# Patient Record
Sex: MALE | Race: WHITE | Employment: UNEMPLOYED | URBAN - METROPOLITAN AREA
[De-identification: names, ages, dates, MRNs, and addresses within clinical notes are randomized per-mention and may not be internally consistent; named-entity substitution may affect disease eponyms.]

---

## 2018-05-23 ENCOUNTER — OFFICE VISIT (OUTPATIENT)
Dept: CARDIOLOGY CLINIC | Age: 53
End: 2018-05-23
Payer: COMMERCIAL

## 2018-05-23 VITALS
HEIGHT: 64 IN | DIASTOLIC BLOOD PRESSURE: 60 MMHG | HEART RATE: 68 BPM | WEIGHT: 206 LBS | BODY MASS INDEX: 35.17 KG/M2 | RESPIRATION RATE: 16 BRPM | SYSTOLIC BLOOD PRESSURE: 104 MMHG

## 2018-05-23 DIAGNOSIS — I10 ESSENTIAL HYPERTENSION: ICD-10-CM

## 2018-05-23 DIAGNOSIS — R07.2 PRECORDIAL PAIN: ICD-10-CM

## 2018-05-23 PROCEDURE — 99204 OFFICE O/P NEW MOD 45 MIN: CPT | Performed by: INTERNAL MEDICINE

## 2018-05-23 PROCEDURE — G8419 CALC BMI OUT NRM PARAM NOF/U: HCPCS | Performed by: INTERNAL MEDICINE

## 2018-05-23 PROCEDURE — G8427 DOCREV CUR MEDS BY ELIG CLIN: HCPCS | Performed by: INTERNAL MEDICINE

## 2018-05-23 PROCEDURE — 4004F PT TOBACCO SCREEN RCVD TLK: CPT | Performed by: INTERNAL MEDICINE

## 2018-05-23 PROCEDURE — 93000 ELECTROCARDIOGRAM COMPLETE: CPT | Performed by: INTERNAL MEDICINE

## 2018-05-23 PROCEDURE — 3017F COLORECTAL CA SCREEN DOC REV: CPT | Performed by: INTERNAL MEDICINE

## 2018-05-23 RX ORDER — LISINOPRIL 40 MG/1
TABLET ORAL
Refills: 3 | COMMUNITY
Start: 2018-05-18

## 2018-05-23 RX ORDER — MECLIZINE HYDROCHLORIDE 25 MG/1
25 TABLET ORAL 3 TIMES DAILY
Refills: 0 | COMMUNITY
Start: 2018-04-16

## 2018-05-23 RX ORDER — TAMSULOSIN HYDROCHLORIDE 0.4 MG/1
CAPSULE ORAL
Refills: 5 | COMMUNITY
Start: 2018-05-09

## 2018-05-23 RX ORDER — OMEPRAZOLE 40 MG/1
CAPSULE, DELAYED RELEASE ORAL
Refills: 5 | COMMUNITY
Start: 2018-05-02

## 2018-05-23 RX ORDER — MELOXICAM 15 MG/1
TABLET ORAL
Refills: 2 | COMMUNITY
Start: 2018-05-18

## 2018-06-05 ENCOUNTER — TELEPHONE (OUTPATIENT)
Dept: CARDIOLOGY CLINIC | Age: 53
End: 2018-06-05

## 2018-06-05 ENCOUNTER — HOSPITAL ENCOUNTER (OUTPATIENT)
Dept: CARDIOLOGY | Age: 53
Discharge: HOME | End: 2018-06-05
Payer: COMMERCIAL

## 2018-06-05 VITALS
DIASTOLIC BLOOD PRESSURE: 74 MMHG | BODY MASS INDEX: 35.17 KG/M2 | SYSTOLIC BLOOD PRESSURE: 124 MMHG | WEIGHT: 206 LBS | HEART RATE: 86 BPM | HEIGHT: 64 IN

## 2018-06-05 DIAGNOSIS — I10 ESSENTIAL HYPERTENSION: ICD-10-CM

## 2018-06-05 DIAGNOSIS — R07.2 PRECORDIAL PAIN: ICD-10-CM

## 2018-06-05 LAB
LV EF: 55 %
LVEF MODALITY: NORMAL

## 2018-06-05 PROCEDURE — 93306 TTE W/DOPPLER COMPLETE: CPT

## 2018-06-05 PROCEDURE — 93017 CV STRESS TEST TRACING ONLY: CPT

## 2018-06-06 ENCOUNTER — TELEPHONE (OUTPATIENT)
Dept: CARDIOLOGY CLINIC | Age: 53
End: 2018-06-06

## 2018-07-25 ENCOUNTER — HOSPITAL ENCOUNTER (OUTPATIENT)
Age: 53
Discharge: HOME | End: 2018-07-27
Payer: COMMERCIAL

## 2018-07-25 DIAGNOSIS — R05.3 CHRONIC COUGH: ICD-10-CM

## 2018-07-25 LAB — EOSINOPHILS ABSOLUTE COUNT: 270 /UL (ref 50–250)

## 2018-07-25 PROCEDURE — 85048 AUTOMATED LEUKOCYTE COUNT: CPT

## 2018-07-25 PROCEDURE — 82785 ASSAY OF IGE: CPT

## 2018-07-25 PROCEDURE — 86003 ALLG SPEC IGE CRUDE XTRC EA: CPT

## 2018-07-30 LAB
Lab: NORMAL
REPORT: NORMAL
THIS TEST SENT TO: NORMAL

## 2018-08-02 ENCOUNTER — HOSPITAL ENCOUNTER (OUTPATIENT)
Age: 53
Discharge: HOME | End: 2018-08-04
Payer: COMMERCIAL

## 2018-08-02 ENCOUNTER — HOSPITAL ENCOUNTER (OUTPATIENT)
Dept: GENERAL RADIOLOGY | Age: 53
Discharge: HOME | End: 2018-08-04
Payer: COMMERCIAL

## 2018-08-02 DIAGNOSIS — R05.3 CHRONIC COUGH: ICD-10-CM

## 2018-08-02 DIAGNOSIS — Z72.0 TOBACCO ABUSE: ICD-10-CM

## 2018-08-02 PROCEDURE — 71046 X-RAY EXAM CHEST 2 VIEWS: CPT

## 2018-08-19 ENCOUNTER — HOSPITAL ENCOUNTER (OUTPATIENT)
Dept: CT IMAGING | Age: 53
Discharge: HOME | End: 2018-08-21
Payer: COMMERCIAL

## 2018-08-19 DIAGNOSIS — R05.9 COUGH: ICD-10-CM

## 2018-08-19 PROCEDURE — 71250 CT THORAX DX C-: CPT

## 2022-04-28 ENCOUNTER — HOSPITAL ENCOUNTER (OUTPATIENT)
Dept: CT IMAGING | Age: 57
Discharge: HOME | End: 2022-04-30
Payer: COMMERCIAL

## 2022-04-28 DIAGNOSIS — F17.200 NICOTINE DEPENDENCE WITH CURRENT USE: ICD-10-CM

## 2022-04-28 DIAGNOSIS — Z87.891 PERSONAL HISTORY OF TOBACCO USE: ICD-10-CM

## 2022-04-28 PROCEDURE — 71271 CT THORAX LUNG CANCER SCR C-: CPT

## 2022-06-17 ENCOUNTER — TELEPHONE (OUTPATIENT)
Dept: CASE MANAGEMENT | Age: 57
End: 2022-06-17

## 2022-06-17 NOTE — TELEPHONE ENCOUNTER
No call, encounter opened to process CT Lung Screening. CT Lung Screen: 4/28/2022    Impression   1. There is no pulmonary infiltrate, mass or suspicious pulmonary nodule. 2. 3 mm inter fissural lymph node abutting the inferior surface of the right   minor fissure.  This is a normal finding. 3. Minimal pleural and parenchymal scarring seen within the right lung base   laterally.       LUNG RADS:   Per ACR Lung-RADS Version 1.1       Category 2, Benign appearance or behavior.       Management:  Continue annual lung screening with LDCT in 12 months.       RECOMMENDATIONS:   If you would like to register your patient with the [facility], please contact the Nurse Navigator at   [phone]. Pack years: 100    Social History     Tobacco Use  Smoking Status: Current Every Day Smoker    Start Date: 05/23/1972   Quit Date:    Types: Cigarettes   Packs/Day: 2   Years: 48   Pack Years: 100   Smokeless Tobacco: Current User         Results letter sent to patient via my chart or mailed.      [facility]

## 2022-12-14 ENCOUNTER — HOSPITAL ENCOUNTER (EMERGENCY)
Age: 57
Discharge: LEFT BEFORE DISCHARGE | End: 2022-12-15
Attending: EMERGENCY MEDICINE
Payer: COMMERCIAL

## 2022-12-14 ENCOUNTER — APPOINTMENT (OUTPATIENT)
Dept: GENERAL RADIOLOGY | Age: 57
End: 2022-12-14
Payer: COMMERCIAL

## 2022-12-14 ENCOUNTER — APPOINTMENT (OUTPATIENT)
Dept: CT IMAGING | Age: 57
End: 2022-12-14
Payer: COMMERCIAL

## 2022-12-14 VITALS
RESPIRATION RATE: 20 BRPM | WEIGHT: 225 LBS | SYSTOLIC BLOOD PRESSURE: 158 MMHG | TEMPERATURE: 97.7 F | BODY MASS INDEX: 38.41 KG/M2 | HEIGHT: 64 IN | OXYGEN SATURATION: 98 % | DIASTOLIC BLOOD PRESSURE: 100 MMHG | HEART RATE: 83 BPM

## 2022-12-14 DIAGNOSIS — K85.90 ACUTE PANCREATITIS, UNSPECIFIED COMPLICATION STATUS, UNSPECIFIED PANCREATITIS TYPE: Primary | ICD-10-CM

## 2022-12-14 LAB
ALBUMIN SERPL-MCNC: 4.2 G/DL (ref 3.5–5.2)
ALP BLD-CCNC: 121 U/L (ref 40–129)
ALT SERPL-CCNC: 135 U/L (ref 0–40)
ANION GAP SERPL CALCULATED.3IONS-SCNC: 11 MMOL/L (ref 7–16)
AST SERPL-CCNC: 154 U/L (ref 0–39)
BACTERIA: NORMAL /HPF
BASOPHILS ABSOLUTE: 0.04 E9/L (ref 0–0.2)
BASOPHILS RELATIVE PERCENT: 0.3 % (ref 0–2)
BILIRUB SERPL-MCNC: 0.6 MG/DL (ref 0–1.2)
BILIRUBIN URINE: NEGATIVE
BLOOD, URINE: NEGATIVE
BUN BLDV-MCNC: 17 MG/DL (ref 6–20)
CALCIUM SERPL-MCNC: 9.1 MG/DL (ref 8.6–10.2)
CHLORIDE BLD-SCNC: 102 MMOL/L (ref 98–107)
CLARITY: CLEAR
CO2: 26 MMOL/L (ref 22–29)
COLOR: YELLOW
CREAT SERPL-MCNC: 1.3 MG/DL (ref 0.7–1.2)
EOSINOPHILS ABSOLUTE: 0.01 E9/L (ref 0.05–0.5)
EOSINOPHILS RELATIVE PERCENT: 0.1 % (ref 0–6)
EPITHELIAL CELLS, UA: NORMAL /HPF
GFR SERPL CREATININE-BSD FRML MDRD: >60 ML/MIN/1.73
GLUCOSE BLD-MCNC: 199 MG/DL (ref 74–99)
GLUCOSE URINE: 100 MG/DL
HCT VFR BLD CALC: 42.9 % (ref 37–54)
HEMOGLOBIN: 14.4 G/DL (ref 12.5–16.5)
IMMATURE GRANULOCYTES #: 0.09 E9/L
IMMATURE GRANULOCYTES %: 0.6 % (ref 0–5)
INFLUENZA A BY PCR: NOT DETECTED
INFLUENZA B BY PCR: NOT DETECTED
KETONES, URINE: NEGATIVE MG/DL
LACTIC ACID: 2.2 MMOL/L (ref 0.5–2.2)
LEUKOCYTE ESTERASE, URINE: NEGATIVE
LIPASE: >3000 U/L (ref 13–60)
LYMPHOCYTES ABSOLUTE: 0.46 E9/L (ref 1.5–4)
LYMPHOCYTES RELATIVE PERCENT: 2.9 % (ref 20–42)
MCH RBC QN AUTO: 31.2 PG (ref 26–35)
MCHC RBC AUTO-ENTMCNC: 33.6 % (ref 32–34.5)
MCV RBC AUTO: 93.1 FL (ref 80–99.9)
MONOCYTES ABSOLUTE: 0.84 E9/L (ref 0.1–0.95)
MONOCYTES RELATIVE PERCENT: 5.3 % (ref 2–12)
NEUTROPHILS ABSOLUTE: 14.33 E9/L (ref 1.8–7.3)
NEUTROPHILS RELATIVE PERCENT: 90.8 % (ref 43–80)
NITRITE, URINE: NEGATIVE
PDW BLD-RTO: 12.7 FL (ref 11.5–15)
PH UA: 7 (ref 5–9)
PLATELET # BLD: 260 E9/L (ref 130–450)
PMV BLD AUTO: 10 FL (ref 7–12)
POTASSIUM REFLEX MAGNESIUM: 4.6 MMOL/L (ref 3.5–5)
PRO-BNP: 69 PG/ML (ref 0–125)
PROTEIN UA: ABNORMAL MG/DL
RBC # BLD: 4.61 E12/L (ref 3.8–5.8)
RBC # BLD: NORMAL 10*6/UL
RBC UA: NORMAL /HPF (ref 0–2)
SARS-COV-2, NAAT: NOT DETECTED
SODIUM BLD-SCNC: 139 MMOL/L (ref 132–146)
SPECIFIC GRAVITY UA: 1.01 (ref 1–1.03)
TOTAL PROTEIN: 6.8 G/DL (ref 6.4–8.3)
TROPONIN, HIGH SENSITIVITY: 12 NG/L (ref 0–11)
UROBILINOGEN, URINE: 2 E.U./DL
WBC # BLD: 15.8 E9/L (ref 4.5–11.5)
WBC UA: NORMAL /HPF (ref 0–5)

## 2022-12-14 PROCEDURE — 83605 ASSAY OF LACTIC ACID: CPT

## 2022-12-14 PROCEDURE — 71046 X-RAY EXAM CHEST 2 VIEWS: CPT

## 2022-12-14 PROCEDURE — 87635 SARS-COV-2 COVID-19 AMP PRB: CPT

## 2022-12-14 PROCEDURE — 87502 INFLUENZA DNA AMP PROBE: CPT

## 2022-12-14 PROCEDURE — 83880 ASSAY OF NATRIURETIC PEPTIDE: CPT

## 2022-12-14 PROCEDURE — 2580000003 HC RX 258: Performed by: EMERGENCY MEDICINE

## 2022-12-14 PROCEDURE — 85025 COMPLETE CBC W/AUTO DIFF WBC: CPT

## 2022-12-14 PROCEDURE — 99285 EMERGENCY DEPT VISIT HI MDM: CPT

## 2022-12-14 PROCEDURE — 81001 URINALYSIS AUTO W/SCOPE: CPT

## 2022-12-14 PROCEDURE — 80053 COMPREHEN METABOLIC PANEL: CPT

## 2022-12-14 PROCEDURE — 93005 ELECTROCARDIOGRAM TRACING: CPT | Performed by: NURSE PRACTITIONER

## 2022-12-14 PROCEDURE — 84484 ASSAY OF TROPONIN QUANT: CPT

## 2022-12-14 PROCEDURE — 83690 ASSAY OF LIPASE: CPT

## 2022-12-14 RX ORDER — SODIUM CHLORIDE, SODIUM LACTATE, POTASSIUM CHLORIDE, AND CALCIUM CHLORIDE .6; .31; .03; .02 G/100ML; G/100ML; G/100ML; G/100ML
1000 INJECTION, SOLUTION INTRAVENOUS ONCE
Status: COMPLETED | OUTPATIENT
Start: 2022-12-14 | End: 2022-12-15

## 2022-12-14 RX ADMIN — SODIUM CHLORIDE, POTASSIUM CHLORIDE, SODIUM LACTATE AND CALCIUM CHLORIDE 1000 ML: 600; 310; 30; 20 INJECTION, SOLUTION INTRAVENOUS at 23:10

## 2022-12-14 ASSESSMENT — PAIN - FUNCTIONAL ASSESSMENT: PAIN_FUNCTIONAL_ASSESSMENT: 0-10

## 2022-12-14 ASSESSMENT — PAIN SCALES - GENERAL: PAINLEVEL_OUTOF10: 10

## 2022-12-14 NOTE — ED PROVIDER NOTES
FIRST PROVIDER CONTACT ASSESSMENT NOTE       Department of Emergency Medicine                 First Provider Note            22  6:56 PM EST    Date of Encounter: No admission date for patient encounter. Patient Name: Garrett Genao  : 1965  MRN: [id number]    Chief Complaint: Abdominal Pain (Beginning today. Hx of gastritis) and Shortness of Breath (Hx of COPD, SpO2 98% on room at at triage)      History of Present Illness:   Garrett Genao is a 62 y.o. male who presents to the ED for reports that at 1:30 PM he started getting generalized abdominal pain and states he has \"gastritis\" he is drinking [employer] pop on arrival. He states he started to get pain after he started eating fried potatoes and onions and  had emesis he states he has had 7-8 emesis since with no bloody emesis and denies any diarrhea or constipation. He states he is moving his bowels normally. He also complains of shortness of breath has a history of COPD patient is tachypneic denies any fever or chills. He denies any sick contacts or recent travel. He denies any chest pain, leg pain or leg swelling. Focused Physical Exam:  VS:    ED Triage Vitals [22 1850]   BP Temp Temp Source Heart Rate Resp SpO2 Height Weight   (!) 158/100 97.7 °F (36.5 °C) Oral (!) 104 20 98 % 5' 4\" (1.626 m) 225 lb (102.1 kg)        Physical Ex: Constitutional: Alert and non-toxic. Medical History:  has a past medical history of Asthma, Bipolar disorder ([address] COPD (chronic obstructive pulmonary disease) ([facility] [address] Hypertension, MI (myocardial infarction) ([facility] [address] Osteoarthritis, Sleep apnea, and Ulcer. Surgical History:  has a past surgical history that includes Lung surgery and Tonsillectomy. Social History:  reports that he has been smoking cigarettes. He started smoking about 50 years ago. He has a 100.00 pack-year smoking history. His smokeless tobacco use includes chew.  He reports that he does not drink alcohol and does not use drugs. Family History: family history includes Arthritis in his father and mother; Cancer in his mother; Heart Attack in his father; No Known Problems in his brother, sister, and sister.     Allergies: Codeine, Dextromethorphan-guaifenesin, and Diphenhydramine     Initial Plan of Care: Initiate Treatment-Testing, Proceed toTreatment Area When Bed Available for ED Attending/MLP to Continue Care      ---[address] ASSESSMENT NOTE---  Electronically signed by SUKHJINDER Valverde CNP   DD: 12/14/22      SUKHJINDER Valverde CNP  12/14/22 [phone], APRN - CNP  12/14/22 0540

## 2022-12-15 LAB
EKG ATRIAL RATE: 83 BPM
EKG P AXIS: 67 DEGREES
EKG P-R INTERVAL: 162 MS
EKG Q-T INTERVAL: 362 MS
EKG QRS DURATION: 90 MS
EKG QTC CALCULATION (BAZETT): 425 MS
EKG R AXIS: 78 DEGREES
EKG T AXIS: 50 DEGREES
EKG VENTRICULAR RATE: 83 BPM

## 2022-12-15 PROCEDURE — 93010 ELECTROCARDIOGRAM REPORT: CPT | Performed by: INTERNAL MEDICINE

## 2022-12-15 NOTE — ED PROVIDER NOTES
HPI:  12/14/22,   Time: 11:33 PM IAN Alarcon is a 62 y.o. male presenting to the ED for abdominal pain, beginning 1 day ago. The complaint has been persistent, moderate in severity, and worsened by nothing. The patient states over the last day has been having abdominal pain. Describes a sharp stabbing pain in his epigastric region that radiates diffusely. States he has had associated nausea and vomiting that is nonbloody nonbilious. No diarrhea. No chest pain. No shortness of breath currently but patient did state he felt short of breath when he first got here but this is since resolved. Patient states he used to drink daily but has not done that in 20 years but does a drink occasionally. Review of Systems:   Pertinent positives and negatives are stated within HPI, all other systems reviewed and are negative.          --------------------------------------------- PAST HISTORY ---------------------------------------------  Past Medical History:  has a past medical history of Asthma, Bipolar disorder ([facility] COPD (chronic obstructive pulmonary disease) ([facility] Hypertension, MI (myocardial infarction) ([facility] Osteoarthritis, Sleep apnea, and Ulcer. Past Surgical History:  has a past surgical history that includes Lung surgery and Tonsillectomy. Social History:  reports that he has been smoking cigarettes. He started smoking about 50 years ago. He has a 100.00 pack-year smoking history. His smokeless tobacco use includes chew. He reports that he does not drink alcohol and does not use drugs. Family History: family history includes Arthritis in his father and mother; Cancer in his mother; Heart Attack in his father; No Known Problems in his brother, sister, and sister. The patients home medications have been reviewed.     Allergies: Codeine, Dextromethorphan-guaifenesin, and Diphenhydramine        ---------------------------------------------------PHYSICAL EXAM--------------------------------------    Constitutional/General: Alert and oriented x3, well appearing, non toxic in NAD  Head: Normocephalic and atraumatic  Eyes: PERRL, EOMI, conjunctive normal, sclera non icteric  Mouth: Oropharynx clear, handling secretions, no trismus, no asymmetry of the posterior oropharynx or uvular edema  Neck: Supple, full ROM, non tender to palpation in the midline, no stridor, no crepitus, no meningeal signs  Respiratory: Lungs clear to auscultation bilaterally, no wheezes, rales, or rhonchi. Not in respiratory distress  Cardiovascular:  Regular rate. Regular rhythm. No murmurs, gallops, or rubs. 2+ distal pulses  Chest: No chest wall tenderness  GI:  Abdomen Soft, Non tender, Non distended. +BS. No organomegaly, no palpable masses,  No rebound, guarding, or rigidity. Musculoskeletal: Moves all extremities x 4. Warm and well perfused, no clubbing, cyanosis, or edema. Capillary refill <3 seconds  Integument: skin warm and dry. No rashes. Lymphatic: no lymphadenopathy noted  Neurologic: GCS 15, no focal deficits, symmetric strength 5/5 in the upper and lower extremities bilaterally  Psychiatric: Normal Affect    -------------------------------------------------- RESULTS -------------------------------------------------  I have personally reviewed all laboratory and imaging results for this patient. Results are listed below.      LABS:  Results for orders placed or performed during the hospital encounter of 12/14/22   COVID-19, Rapid    Specimen: Nasopharyngeal Swab   Result Value Ref Range    SARS-CoV-2, NAAT Not Detected Not Detected   RAPID INFLUENZA A/B ANTIGENS    Specimen: Nasopharyngeal   Result Value Ref Range    Influenza A by PCR Not Detected Not Detected    Influenza B by PCR Not Detected Not Detected   CBC with Auto Differential   Result Value Ref Range    WBC 15.8 (H) 4.5 - 11.5 E9/L    RBC 4.61 3.80 - 5.80 E12/L    Hemoglobin 14.4 12.5 - 16.5 g/dL    Hematocrit 42.9 37.0 - 54.0 %    MCV 93.1 80.0 - 99.9 fL    MCH 31.2 26.0 - 35.0 pg    MCHC 33.6 32.0 - 34.5 %    RDW 12.7 11.5 - 15.0 fL    Platelets 254 705 - 436 E9/L    MPV 10.0 7.0 - 12.0 fL    Neutrophils % 90.8 (H) 43.0 - 80.0 %    Immature Granulocytes % 0.6 0.0 - 5.0 %    Lymphocytes % 2.9 (L) 20.0 - 42.0 %    Monocytes % 5.3 2.0 - 12.0 %    Eosinophils % 0.1 0.0 - 6.0 %    Basophils % 0.3 0.0 - 2.0 %    Neutrophils Absolute 14.33 (H) 1.80 - 7.30 E9/L    Immature Granulocytes # 0.09 E9/L    Lymphocytes Absolute 0.46 (L) 1.50 - 4.00 E9/L    Monocytes Absolute 0.84 0.10 - 0.95 E9/L    Eosinophils Absolute 0.01 (L) 0.05 - 0.50 E9/L    Basophils Absolute 0.04 0.00 - 0.20 E9/L    RBC Morphology Normal    Lipase   Result Value Ref Range    Lipase >3,000 (H) 13 - 60 U/L   Lactic Acid   Result Value Ref Range    Lactic Acid 2.2 0.5 - 2.2 mmol/L   Comprehensive Metabolic Panel w/ Reflex to MG   Result Value Ref Range    Sodium 139 132 - 146 mmol/L    Potassium reflex Magnesium 4.6 3.5 - 5.0 mmol/L    Chloride 102 98 - 107 mmol/L    CO2 26 22 - 29 mmol/L    Anion Gap 11 7 - 16 mmol/L    Glucose 199 (H) 74 - 99 mg/dL    BUN 17 6 - 20 mg/dL    Creatinine 1.3 (H) 0.7 - 1.2 mg/dL    Est, Glom Filt Rate >60 >=60 mL/min/1.73    Calcium 9.1 8.6 - 10.2 mg/dL    Total Protein 6.8 6.4 - 8.3 g/dL    Albumin 4.2 3.5 - 5.2 g/dL    Total Bilirubin 0.6 0.0 - 1.2 mg/dL    Alkaline Phosphatase 121 40 - 129 U/L     (H) 0 - 40 U/L     (H) 0 - 39 U/L   Brain Natriuretic Peptide   Result Value Ref Range    Pro-BNP 69 0 - 125 pg/mL   Troponin   Result Value Ref Range    Troponin, High Sensitivity 12 (H) 0 - 11 ng/L   Urinalysis   Result Value Ref Range    Color, UA Yellow Straw/Yellow    Clarity, UA Clear Clear    Glucose, Ur 100 (A) Negative mg/dL    Bilirubin Urine Negative Negative    Ketones, Urine Negative Negative mg/dL    Specific Gravity, UA 1.015 1.005 - 1.030    Blood, Urine Negative Negative    pH, UA 7.0 5.0 - 9.0    Protein, UA TRACE Negative mg/dL    Urobilinogen, Urine 2.0 (A) <2.0 E.U./dL    Nitrite, Urine Negative Negative    Leukocyte Esterase, Urine Negative Negative   Microscopic Urinalysis   Result Value Ref Range    WBC, UA 0-1 0 - 5 /HPF    RBC, UA 0-1 0 - 2 /HPF    Epithelial Cells, UA RARE /HPF    Bacteria, UA NONE SEEN None Seen /HPF   EKG 12 Lead   Result Value Ref Range    Ventricular Rate 83 BPM    Atrial Rate 83 BPM    P-R Interval 162 ms    QRS Duration 90 ms    Q-T Interval 362 ms    QTc Calculation (Bazett) 425 ms    P Axis 67 degrees    R Axis 78 degrees    T Axis 50 degrees       RADIOLOGY:  Interpreted by Radiologist.  XR CHEST (2 VW)   Final Result   1. Retrocardiac and bibasilar opacities. 2.  Central pulmonary vascular congestion. RECOMMENDATION:   (Recommend upright PA and lateral chest radiographs 10-12 weeks after   resolution of patient's symptoms to ensure complete resolution of   radiographic findings.)         CT ABDOMEN PELVIS W IV CONTRAST Additional Contrast? None    (Results Pending)       EKG: This EKG is signed and interpreted by the EP. EG shows normal sinus rhythm with sinus arrhythmia 76 bpm.  Normal axis. Normal QRS. No STEMI.    ------------------------- NURSING NOTES AND VITALS REVIEWED ---------------------------   The nursing notes within the ED encounter and vital signs as below have been reviewed by myself. BP (!) 158/100   Pulse 83   Temp 97.7 °F (36.5 °C) (Oral)   Resp 20   Ht 5' 4\" (1.626 m)   Wt 225 lb (102.1 kg)   SpO2 98%   BMI 38.62 kg/m²   Oxygen Saturation Interpretation: Normal    The patients available past medical records and past encounters were reviewed.         ------------------------------ ED COURSE/MEDICAL DECISION MAKING----------------------  Medications   lactated ringers bolus (1,000 mLs IntraVENous New Bag 12/14/22 3390)   iopamidol (ISOVUE-370) 76 % injection 75 mL (has no administration in time range)         ED COURSE:       Medical Decision Making: This is a 59-year-old male presented to the ED for abdominal pain. Patient underwent laboratory work-up which showed a normal CBC except for white count of 15.8. COVID test and influenza test negative. Lipase greater than 3000. Lactic acid 2.2. Chemistry showed mild elevations in ALT and AST and patient's glucose was 199 with creatinine 1.3. BMP within normal limits. Troponin 12. Urinalysis negative. Patient started on LR. Plan was for CT imaging and admission. Explained to the patient that he did have pancreatitis and inflammation of his pancreas. Patient understood the plan. While waiting for CT patient became agitated stating that he was leaving because he needed to go smoke a cigarette. Patient pulled out his own IV and eloped from the emergency department prior to completion of care. I, Dr. Soren Funez, am the primary provider for this encounter    This patient's ED course included: a personal history and physicial examination, re-evaluation prior to disposition, multiple bedside re-evaluations, IV medications, cardiac monitoring, and continuous pulse oximetry    This patient has remained hemodynamically stable during their ED course. Re-Evaluations:             Re-evaluation. Patients symptoms are improving      Counseling: The emergency provider has spoken with the patient and discussed todays results, in addition to providing specific details for the plan of care and counseling regarding the diagnosis and prognosis. Questions are answered at this time and they are agreeable with the plan.       --------------------------------- IMPRESSION AND DISPOSITION ---------------------------------    IMPRESSION  1. Acute pancreatitis, unspecified complication status, unspecified pancreatitis type        DISPOSITION  Disposition: Eloped from emergency department  Patient condition is stable    NOTE: This report was transcribed using voice recognition software.  Every effort was made to ensure accuracy; however, inadvertent computerized transcription errors may be present        Chino Demarco DO  12/14/22 1270